# Patient Record
(demographics unavailable — no encounter records)

---

## 2025-07-23 NOTE — PHYSICAL EXAM
[Non-Tender] : non-tender [Smooth] : smooth [General Appearance - Alert] : alert [General Appearance - In No Acute Distress] : in no acute distress [General Appearance - Well Nourished] : well nourished [General Appearance - Well Developed] : well developed [General Appearance - Well-Appearing] : healthy appearing [Sclera] : the sclera and conjunctiva were normal [Hearing Threshold Finger Rub Not [place]] : hearing was normal [Oropharynx] : the oropharynx was normal [Neck Appearance] : the appearance of the neck was normal [Neck Cervical Mass (___cm)] : no neck mass was observed [Jugular Venous Distention Increased] : there was no jugular-venous distention [Respiration, Rhythm And Depth] : normal respiratory rhythm and effort [Exaggerated Use Of Accessory Muscles For Inspiration] : no accessory muscle use [Auscultation Breath Sounds / Voice Sounds] : lungs were clear to auscultation bilaterally [Heart Rate And Rhythm] : heart rate was normal and rhythm regular [Heart Sounds] : normal S1 and S2 [Murmurs] : no murmurs [Edema] : there was no peripheral edema [Bowel Sounds] : normal bowel sounds [Abdomen Soft] : soft [Abdomen Tenderness] : non-tender [Abdomen Mass (___ Cm)] : no abdominal mass palpated [Abnormal Walk] : normal gait [Nail Clubbing] : no clubbing  or cyanosis of the fingernails [Involuntary Movements] : no involuntary movements were seen [Skin Color & Pigmentation] : normal skin color and pigmentation [Skin Turgor] : normal skin turgor [] : no rash [Oriented To Time, Place, And Person] : oriented to person, place, and time [Impaired Insight] : insight and judgment were intact [Affect] : the affect was normal [Mood] : the mood was normal [Memory Recent] : recent memory was not impaired [Memory Remote] : remote memory was not impaired [Scleral Icterus] : No Scleral Icterus [Spider Angioma] : No spider angioma(s) were observed [Abdominal  Ascites] : no ascites [Jaundice] : No jaundice [Palmar Erythema] : no Palmar Erythema [FreeTextEntry1] : +central adiposity

## 2025-07-23 NOTE — CONSULT LETTER
[Dear  ___] : Dear  [unfilled], [Courtesy Letter:] : I had the pleasure of seeing your patient, [unfilled], in my office today. [Please see my note below.] : Please see my note below. [Consult Closing:] : Thank you very much for allowing me to participate in the care of this patient.  If you have any questions, please do not hesitate to contact me. [FreeTextEntry2] : Dr. Rocio Martinez [FreeTextEntry3] : Sincerely,\par  \par  Suzette Angela M.D., Ph.D.\par  Director, Women's Liver Health Program, [facility]\par  Transplant Hepatology, [facility]\par   of Medicine\par  [employer] [facility] [employer] [facility]\par  [address]\[place] [address]\par  Tel: [phone]\par  Fax: [phone]\par  Cell: [phone]\par  E-mail: [email or web address]

## 2025-07-23 NOTE — HISTORY OF PRESENT ILLNESS
[FreeTextEntry1] : Ms. Glover is a 58 yo F with obesity, prediabetes, hypertension, dyslipidemia with marked hypertriglyceridemia, carotid artery stenosis, nephrolithiasis, a history of hepatitis C that was successfully treated in 2007 with SVR, and metabolic dysfunction-associated steatotic liver disease (MASLD). She has no known history of cirrhosis or hepatocellular carcinoma (HCC).  FibroScan (12/6/19): Median liver stiffness 5.9 kPA (consistent with F0-1 fibrosis) and CAP score 276 dB/m (consistent with S2 steatosis by the old cut-offs, S0-1 steatosis by the new cut-offs).  FibroScan (2/2/21): Median liver stiffness 6.1 kPA (consistent with F0-1 fibrosis) and CAP score 249 dB/m (consistent with S0 steatosis).  FibroScan (3/17/22): Median liver stiffness 6.7 kPA (consistent with F0-1 fibrosis) and CAP score 332 dB/m (consistent with S2 steatosis).  MRI abdomen with and without contrast (4/19/22): Mild fatty infiltration of the liver. No focal hepatic lesion. Normal size spleen. No ascites. Cholecystectomy.  FibroScan (2/26/24): Median liver stiffness 10.2 kPA (consistent with F3 fibrosis, with IQR 13%) and CAP score 281 dB/m (consistent with S2-3 steatosis).  She was last seen by me on 8/5/24 (almost 1 year ago) and is here today for follow-up. At our last visit, MR elastography was ordered but she never got it done. She says she initially received a denial of authorization from her insurance though I was not made aware. She has since changed insurance plans and we obtained authorization today so that she can schedule the MR elastography as soon as possible. She requested a same-day benzodiazepine in order to be able to complete the MRI. She reports feeling well overall but has not been exercising and has not made significant dietary changes. She sometimes snacks on fruit or ice cream between breakfast and dinner. She was recently prescribed fenofibrate by her PCP after labs from 4/20/25 showed hypertriglyceridemia of 485 mg/dL, however, she has not started it yet. She also has not yet discussed stool-based colon cancer screening with her PCP as previously discussed and recommended but adamantly declines doing CT colonography or colonoscopy when re-discussed again today.
Yes

## 2025-07-23 NOTE — ASSESSMENT
[FreeTextEntry1] : # History of chronic HCV, treated in 2007 with SVR: - Her pre-treatment fibrosis stage is not known based on available information, but she does not report known cirrhosis. Prior to her most recent FibroScan (2/26/24), her post-SVR non-invasive staging of fibrosis had suggested minimal if any residual fibrosis (stage 0-1), though it is possible that this reflects regression of prior more advanced fibrosis that could have been present prior to her HCV cure. - No definite indication for lifelong HCC screening given no known history of advanced fibrosis/cirrhosis, but will need to start this if MR elastography (below) confirms advanced fibrosis as suggested by prior FibroScan. - We have discussed that her anti-HCV antibody may remain positive lifelong but does not confer protection against re-infection if re-exposed to HCV.  # Elevated AFP: - She has a history of mildly elevated AFP on prior labs, but subsequently with no suspicious hepatic lesion on MRI abdomen (4/19/22). The AFP elevation likely reflected hepatic regeneration. - Her most recent AFP was stable and mildly elevated at 9.1 ng/mL (4/20/25). Prior AFP-L3 and DCP were negative. - She is planned for MRI again, as below.  # Metabolic dysfunction-associated steatotic liver disease (MASLD): - Prior non-invasive staging of fibrosis with FibroScan in 3/2022 suggested stage 0-1 fibrosis, but repeat FibroScan most recently (on 2/26/24) showed elevated median liver stiffness in the range of stage 3 fibrosis. This either represents interval progression of her disease or, alternatively, an overestimation such as due to measurement/technical problems. - MR elastography re-ordered today for further evaluation, as she declined biopsy for now. - If her MR elastography confirms advanced fibrosis, we will need to discuss potential pharmacologic options including possible resmetirom (Rezdiffra) or GLP-1 RA therapy to try to reduce the likelihood of her proceeding to cirrhosis. - We have discussed the need for gradual weight loss, adherence to a Mediterranean style diet, and regular exercise. She was previously referred to dietician Nilsa Leach for individualized dietary counseling but did not proceed with it yet.  # Health maintenance: - Hypertriglyceridemia: I strongly advised her to start fenofibrate as prescribed by her PCP. - Vaccinations: She is immune to HAV but non-immune to HBV and would benefit from vaccinations. - Colon cancer screening: No prior colonoscopy or colon cancer screening (overdue). Today, we again discussed the potential benefits and risks of alternatives for colon cancer screening including colonoscopy, CT colonography, and stool-based tests. She again declined colonoscopy or CT colonography and said she would like to proceed with annual FIT or q3 year Cologuard with her PCP. She is aware of limitations in terms of sensitivity of stool-based tests as well as the potential need for diagnostic colonoscopy if positive. - Ms. JALLOH was counseled to: abstain from alcohol and all illicit drugs; avoid use of herbal and dietary supplements due to potential hepatotoxicity; and limit use of acetaminophen to <2 grams per day.  Next follow-up: 3 months